# Patient Record
Sex: FEMALE | Race: OTHER
[De-identification: names, ages, dates, MRNs, and addresses within clinical notes are randomized per-mention and may not be internally consistent; named-entity substitution may affect disease eponyms.]

---

## 2023-05-23 ENCOUNTER — HOSPITAL ENCOUNTER (INPATIENT)
Dept: HOSPITAL 54 - ER | Age: 33
LOS: 4 days | Discharge: HOME | DRG: 263 | End: 2023-05-27
Attending: INTERNAL MEDICINE | Admitting: INTERNAL MEDICINE
Payer: COMMERCIAL

## 2023-05-23 VITALS — HEIGHT: 62 IN | BODY MASS INDEX: 27.97 KG/M2 | WEIGHT: 152 LBS

## 2023-05-23 DIAGNOSIS — R74.01: ICD-10-CM

## 2023-05-23 DIAGNOSIS — K66.0: ICD-10-CM

## 2023-05-23 DIAGNOSIS — F12.90: ICD-10-CM

## 2023-05-23 DIAGNOSIS — K80.10: Primary | ICD-10-CM

## 2023-05-23 DIAGNOSIS — R42: ICD-10-CM

## 2023-05-23 DIAGNOSIS — Z20.822: ICD-10-CM

## 2023-05-23 DIAGNOSIS — K21.9: ICD-10-CM

## 2023-05-23 PROCEDURE — A4223 INFUSION SUPPLIES W/O PUMP: HCPCS

## 2023-05-23 PROCEDURE — C9113 INJ PANTOPRAZOLE SODIUM, VIA: HCPCS

## 2023-05-23 PROCEDURE — C9803 HOPD COVID-19 SPEC COLLECT: HCPCS

## 2023-05-23 PROCEDURE — A6209 FOAM DRSG <=16 SQ IN W/O BDR: HCPCS

## 2023-05-23 PROCEDURE — A4216 STERILE WATER/SALINE, 10 ML: HCPCS

## 2023-05-23 PROCEDURE — G0378 HOSPITAL OBSERVATION PER HR: HCPCS

## 2023-05-23 NOTE — NUR
BREANNA FROM HOME FOR EPIGASTRIC PAIN AND N/V UNABLE TO TOLERATE ANY PO SINCE 
THIS AM. SEEING GI SPECIALIST  CURRENTLY. ALSO ADDS SHE HAS HAD MANY GI ISSUES 
SINCE BIRTH OF FIRST BABY IN JANUARY OF THIS YEAR. PLACED COMFORTABLY IN BED, 
VITALS CHECKED.

## 2023-05-24 VITALS — SYSTOLIC BLOOD PRESSURE: 97 MMHG | DIASTOLIC BLOOD PRESSURE: 67 MMHG

## 2023-05-24 VITALS — DIASTOLIC BLOOD PRESSURE: 59 MMHG | SYSTOLIC BLOOD PRESSURE: 99 MMHG

## 2023-05-24 VITALS — SYSTOLIC BLOOD PRESSURE: 105 MMHG | DIASTOLIC BLOOD PRESSURE: 72 MMHG

## 2023-05-24 LAB
ALBUMIN SERPL BCP-MCNC: 4.2 G/DL (ref 3.4–5)
ALP SERPL-CCNC: 97 U/L (ref 46–116)
ALT SERPL W P-5'-P-CCNC: 89 U/L (ref 12–78)
AST SERPL W P-5'-P-CCNC: 99 U/L (ref 15–37)
BASOPHILS # BLD AUTO: 0.1 K/UL (ref 0–0.2)
BASOPHILS NFR BLD AUTO: 0.5 % (ref 0–2)
BILIRUB DIRECT SERPL-MCNC: 0.9 MG/DL (ref 0–0.2)
BILIRUB SERPL-MCNC: 2.2 MG/DL (ref 0.2–1)
BUN SERPL-MCNC: 14 MG/DL (ref 7–18)
CALCIUM SERPL-MCNC: 9.8 MG/DL (ref 8.5–10.1)
CHLORIDE SERPL-SCNC: 104 MMOL/L (ref 98–107)
CO2 SERPL-SCNC: 26 MMOL/L (ref 21–32)
CREAT SERPL-MCNC: 1 MG/DL (ref 0.6–1.3)
EOSINOPHIL NFR BLD AUTO: 0.4 % (ref 0–6)
GLUCOSE SERPL-MCNC: 107 MG/DL (ref 74–106)
HCT VFR BLD AUTO: 41 % (ref 33–45)
HGB BLD-MCNC: 13.5 G/DL (ref 11.5–14.8)
LIPASE SERPL-CCNC: 50 U/L (ref 73–393)
LYMPHOCYTES NFR BLD AUTO: 1.9 K/UL (ref 0.8–4.8)
LYMPHOCYTES NFR BLD AUTO: 11.1 % (ref 20–44)
MCHC RBC AUTO-ENTMCNC: 33 G/DL (ref 31–36)
MCV RBC AUTO: 81 FL (ref 82–100)
MONOCYTES NFR BLD AUTO: 0.8 K/UL (ref 0.1–1.3)
MONOCYTES NFR BLD AUTO: 4.5 % (ref 2–12)
NEUTROPHILS # BLD AUTO: 14.5 K/UL (ref 1.8–8.9)
NEUTROPHILS NFR BLD AUTO: 83.5 % (ref 43–81)
PLATELET # BLD AUTO: 326 K/UL (ref 150–450)
POTASSIUM SERPL-SCNC: 3.4 MMOL/L (ref 3.5–5.1)
PROT SERPL-MCNC: 8 G/DL (ref 6.4–8.2)
RBC # BLD AUTO: 5.03 MIL/UL (ref 4–5.2)
SODIUM SERPL-SCNC: 140 MMOL/L (ref 136–145)
WBC NRBC COR # BLD AUTO: 17.4 K/UL (ref 4.3–11)

## 2023-05-24 RX ADMIN — DEXTROSE MONOHYDRATE PRN MG: 50 INJECTION, SOLUTION INTRAVENOUS at 22:09

## 2023-05-24 RX ADMIN — SODIUM CHLORIDE SCH MG: 9 INJECTION, SOLUTION INTRAVENOUS at 18:16

## 2023-05-24 RX ADMIN — PIPERACILLIN SODIUM AND TAZOBACTAM SODIUM SCH MLS/HR: .375; 3 INJECTION, POWDER, LYOPHILIZED, FOR SOLUTION INTRAVENOUS at 18:16

## 2023-05-24 RX ADMIN — SODIUM CHLORIDE SCH MG: 9 INJECTION, SOLUTION INTRAVENOUS at 14:22

## 2023-05-24 RX ADMIN — DEXTROSE MONOHYDRATE PRN MG: 50 INJECTION, SOLUTION INTRAVENOUS at 14:36

## 2023-05-24 NOTE — NUR
ANXIETY 

Patient wanted to sleep, request medication for ANXIETY. Education given to patient, ANXIETY 
medication and RESTORIL indication and possible side effect, patient verbalized 
understanding. Patient requesting medication she received last night- Ativan. Notified Dr. Gama with new order- Xanax 0.5mg q6h PRN.

## 2023-05-24 NOTE — NUR
SPOKE TO JENIFER BRIONES CM, FAXED OVER [employer] FOR TRANSFER NOTE AT HER REQUEST.

PT HAS RECEIVED A BED AT [facility], THEY WILL CALL BACK AFTER 
NOTE IS RECEIVED TO GIVE MORE ACCEPTANCE INFORMATION.

## 2023-05-24 NOTE — NUR
RN CLOSING NOTE:



PT AAOX3, BF AT BEDSIDE IRAJ. PT DENIES PAIN OR DISCOMFORT. NO SOB, CALL LIGHT WITHIN 
REACH. NEEDS ANSWERED PROMPTLY. ASSISTED TO BR AS NEEDED. BM X2 SMALL, VOIDED X2. CALM AND 
STABLE AT THIS TIME.

## 2023-05-25 VITALS — SYSTOLIC BLOOD PRESSURE: 101 MMHG | DIASTOLIC BLOOD PRESSURE: 64 MMHG

## 2023-05-25 VITALS — SYSTOLIC BLOOD PRESSURE: 94 MMHG | DIASTOLIC BLOOD PRESSURE: 56 MMHG

## 2023-05-25 VITALS — DIASTOLIC BLOOD PRESSURE: 63 MMHG | SYSTOLIC BLOOD PRESSURE: 91 MMHG

## 2023-05-25 LAB
ALBUMIN SERPL BCP-MCNC: 3.4 G/DL (ref 3.4–5)
ALP SERPL-CCNC: 105 U/L (ref 46–116)
ALT SERPL W P-5'-P-CCNC: 286 U/L (ref 12–78)
AST SERPL W P-5'-P-CCNC: 119 U/L (ref 15–37)
BASOPHILS # BLD AUTO: 0.1 K/UL (ref 0–0.2)
BASOPHILS NFR BLD AUTO: 1.1 % (ref 0–2)
BILIRUB SERPL-MCNC: 1.8 MG/DL (ref 0.2–1)
BUN SERPL-MCNC: 8 MG/DL (ref 7–18)
CALCIUM SERPL-MCNC: 9 MG/DL (ref 8.5–10.1)
CHLORIDE SERPL-SCNC: 107 MMOL/L (ref 98–107)
CO2 SERPL-SCNC: 24 MMOL/L (ref 21–32)
CREAT SERPL-MCNC: 0.9 MG/DL (ref 0.6–1.3)
EOSINOPHIL NFR BLD AUTO: 2.3 % (ref 0–6)
GLUCOSE SERPL-MCNC: 85 MG/DL (ref 74–106)
HCT VFR BLD AUTO: 35 % (ref 33–45)
HGB BLD-MCNC: 12.1 G/DL (ref 11.5–14.8)
LYMPHOCYTES NFR BLD AUTO: 2.8 K/UL (ref 0.8–4.8)
LYMPHOCYTES NFR BLD AUTO: 42.3 % (ref 20–44)
MAGNESIUM SERPL-MCNC: 2.2 MG/DL (ref 1.8–2.4)
MCHC RBC AUTO-ENTMCNC: 34 G/DL (ref 31–36)
MCV RBC AUTO: 81 FL (ref 82–100)
MONOCYTES NFR BLD AUTO: 0.4 K/UL (ref 0.1–1.3)
MONOCYTES NFR BLD AUTO: 5.9 % (ref 2–12)
NEUTROPHILS # BLD AUTO: 3.2 K/UL (ref 1.8–8.9)
NEUTROPHILS NFR BLD AUTO: 48.4 % (ref 43–81)
PLATELET # BLD AUTO: 232 K/UL (ref 150–450)
POTASSIUM SERPL-SCNC: 3.9 MMOL/L (ref 3.5–5.1)
PROT SERPL-MCNC: 6.7 G/DL (ref 6.4–8.2)
RBC # BLD AUTO: 4.36 MIL/UL (ref 4–5.2)
SODIUM SERPL-SCNC: 140 MMOL/L (ref 136–145)
TSH SERPL DL<=0.005 MIU/L-ACNC: 1.22 UIU/ML (ref 0.36–3.74)
WBC NRBC COR # BLD AUTO: 6.6 K/UL (ref 4.3–11)

## 2023-05-25 PROCEDURE — BF37ZZZ MAGNETIC RESONANCE IMAGING (MRI) OF PANCREAS: ICD-10-PCS

## 2023-05-25 PROCEDURE — BF36ZZZ MAGNETIC RESONANCE IMAGING (MRI) OF LIVER AND SPLEEN: ICD-10-PCS

## 2023-05-25 RX ADMIN — PIPERACILLIN SODIUM AND TAZOBACTAM SODIUM SCH MLS/HR: .375; 3 INJECTION, POWDER, LYOPHILIZED, FOR SOLUTION INTRAVENOUS at 00:43

## 2023-05-25 RX ADMIN — PIPERACILLIN SODIUM AND TAZOBACTAM SODIUM SCH MLS/HR: .375; 3 INJECTION, POWDER, LYOPHILIZED, FOR SOLUTION INTRAVENOUS at 11:32

## 2023-05-25 RX ADMIN — DEXTROSE MONOHYDRATE PRN MG: 50 INJECTION, SOLUTION INTRAVENOUS at 21:48

## 2023-05-25 RX ADMIN — PIPERACILLIN SODIUM AND TAZOBACTAM SODIUM SCH MLS/HR: .375; 3 INJECTION, POWDER, LYOPHILIZED, FOR SOLUTION INTRAVENOUS at 23:52

## 2023-05-25 RX ADMIN — PIPERACILLIN SODIUM AND TAZOBACTAM SODIUM SCH MLS/HR: .375; 3 INJECTION, POWDER, LYOPHILIZED, FOR SOLUTION INTRAVENOUS at 17:07

## 2023-05-25 RX ADMIN — PIPERACILLIN SODIUM AND TAZOBACTAM SODIUM SCH MLS/HR: .375; 3 INJECTION, POWDER, LYOPHILIZED, FOR SOLUTION INTRAVENOUS at 06:07

## 2023-05-25 RX ADMIN — TEMAZEPAM PRN MG: 7.5 CAPSULE ORAL at 00:51

## 2023-05-25 RX ADMIN — SODIUM CHLORIDE SCH MG: 9 INJECTION, SOLUTION INTRAVENOUS at 08:47

## 2023-05-25 RX ADMIN — TEMAZEPAM PRN MG: 7.5 CAPSULE ORAL at 21:48

## 2023-05-25 NOTE — NUR
MS RN OPENING NOTE



Received pt in bed, awake. A/O x 4, able to make needs known. No c/o pain/discomfort at this 
time. On room air, tolerating well. IV access in the LAC #20g, sl. Safety measures in place: 
bed in lowest locked position, side rails up x 2, call light and tray table within easy 
reach. Will continue to monitor.

## 2023-05-25 NOTE — NUR
RN NOTE



Consent for surgery signed by patient. Instructed patient not to eat and drink after 
midnight.

## 2023-05-25 NOTE — NUR
MS RN NOTE

PT STATED SHE WAS HAVING NAUSEA. PRN IV MEDICATION, ZOFRAN, ADMINISTERED TO THE PT PER MD 
ORDER.

## 2023-05-25 NOTE — NUR
MS RN NOTE

PT REQUESTED FOR SLEEPING PILL. PRN PO MEDICATION, TEMAZEPAM 7.5MG, ADMINISTERED TO THE PT 
PER MD ORDER.

## 2023-05-25 NOTE — NUR
MS RN NOTE

PT STATED THAT SHE WAS HAVING HEADACHE AND NEEDED MEDICATIONS FOR IT. PRN PO MEDICATION, 
TYLENOL 650 ML, ADMINISTERED PER MD ORDER.

## 2023-05-25 NOTE — NUR
END OF SHIFT REPORT

Patient in bed, Alert Oriented x4. Oxygen sat high 90's in RA. IV peripheral line left AC 
intact, on IV abx with no adverse side effect. Patient on Clear liquids diet. Afebrile 
during the night. Nausea improved with IV Zofran, no emesis. Denies abd pain. Plan for MRCP 
wo contrast. Patient consented procedure. MRI questionnaire checklist completed. Endorsed 
care to ALVINA Tanner.

## 2023-05-25 NOTE — NUR
MS RN CLOSING NOTE



Patient resting in bed, with relative at bedside. A/O x 4, able to make needs known. No c/o 
pain/discomfort at this time. On room air, tolerating well. IV access in the LAC #20g, sl. 
Safety measures in place: bed in lowest locked position, side rails up x 2, call light and 
tray table within easy reach. Will endorse dhaval to night shift.

## 2023-05-25 NOTE — NUR
MS RN OPENING NOTE

PATIENT IS RESTING IN BED, ALERT AND ORIENTED, AO X 4. SHE IS ON RA, TOLERATED WELL. NO S/S 
OF DISTRESS OR SOB. PT DENIES OF HAVING PAIN AT THIS MOMENT. IV ACCESS IS AT HER L AC, #20G, 
SL. FLUSHED WELL WITH NS. IV SITE IS PATENT AND INTACT. SAFETY MEASURES ARE IN PLACED: BED 
IN LOWEST AND LOCKED POSITION; SIDE RAILS UP X 2; CALL LIGHT AND TABLE ARE WITHIN REACH. 
WILL CONTINUE MONITORING THE PT AND PROVIDE THE CARE PT NEEDS.

## 2023-05-26 VITALS — DIASTOLIC BLOOD PRESSURE: 74 MMHG | SYSTOLIC BLOOD PRESSURE: 110 MMHG

## 2023-05-26 VITALS — SYSTOLIC BLOOD PRESSURE: 108 MMHG | DIASTOLIC BLOOD PRESSURE: 72 MMHG

## 2023-05-26 VITALS — SYSTOLIC BLOOD PRESSURE: 122 MMHG | DIASTOLIC BLOOD PRESSURE: 83 MMHG

## 2023-05-26 LAB
ALBUMIN SERPL BCP-MCNC: 3.6 G/DL (ref 3.4–5)
ALP SERPL-CCNC: 101 U/L (ref 46–116)
ALT SERPL W P-5'-P-CCNC: 203 U/L (ref 12–78)
AST SERPL W P-5'-P-CCNC: 41 U/L (ref 15–37)
BASOPHILS # BLD AUTO: 0.1 K/UL (ref 0–0.2)
BASOPHILS NFR BLD AUTO: 1 % (ref 0–2)
BILIRUB SERPL-MCNC: 1.5 MG/DL (ref 0.2–1)
BUN SERPL-MCNC: 7 MG/DL (ref 7–18)
CALCIUM SERPL-MCNC: 9.1 MG/DL (ref 8.5–10.1)
CHLORIDE SERPL-SCNC: 104 MMOL/L (ref 98–107)
CO2 SERPL-SCNC: 23 MMOL/L (ref 21–32)
CREAT SERPL-MCNC: 0.9 MG/DL (ref 0.6–1.3)
EOSINOPHIL NFR BLD AUTO: 2.3 % (ref 0–6)
GLUCOSE SERPL-MCNC: 87 MG/DL (ref 74–106)
HCT VFR BLD AUTO: 38 % (ref 33–45)
HGB BLD-MCNC: 12.9 G/DL (ref 11.5–14.8)
LYMPHOCYTES NFR BLD AUTO: 2.5 K/UL (ref 0.8–4.8)
LYMPHOCYTES NFR BLD AUTO: 34.9 % (ref 20–44)
MCHC RBC AUTO-ENTMCNC: 34 G/DL (ref 31–36)
MCV RBC AUTO: 82 FL (ref 82–100)
MONOCYTES NFR BLD AUTO: 0.4 K/UL (ref 0.1–1.3)
MONOCYTES NFR BLD AUTO: 5.7 % (ref 2–12)
NEUTROPHILS # BLD AUTO: 4 K/UL (ref 1.8–8.9)
NEUTROPHILS NFR BLD AUTO: 56.1 % (ref 43–81)
PLATELET # BLD AUTO: 255 K/UL (ref 150–450)
POTASSIUM SERPL-SCNC: 3.6 MMOL/L (ref 3.5–5.1)
PROT SERPL-MCNC: 6.9 G/DL (ref 6.4–8.2)
RBC # BLD AUTO: 4.67 MIL/UL (ref 4–5.2)
SODIUM SERPL-SCNC: 138 MMOL/L (ref 136–145)
WBC NRBC COR # BLD AUTO: 7.1 K/UL (ref 4.3–11)

## 2023-05-26 PROCEDURE — 0FT44ZZ RESECTION OF GALLBLADDER, PERCUTANEOUS ENDOSCOPIC APPROACH: ICD-10-PCS

## 2023-05-26 PROCEDURE — 0DNU4ZZ RELEASE OMENTUM, PERCUTANEOUS ENDOSCOPIC APPROACH: ICD-10-PCS

## 2023-05-26 RX ADMIN — DEXTROSE MONOHYDRATE PRN MG: 50 INJECTION, SOLUTION INTRAVENOUS at 11:10

## 2023-05-26 RX ADMIN — PIPERACILLIN SODIUM AND TAZOBACTAM SODIUM SCH MLS/HR: .375; 3 INJECTION, POWDER, LYOPHILIZED, FOR SOLUTION INTRAVENOUS at 18:26

## 2023-05-26 RX ADMIN — GABAPENTIN SCH MG: 100 CAPSULE ORAL at 11:10

## 2023-05-26 RX ADMIN — CELECOXIB SCH MG: 100 CAPSULE ORAL at 22:41

## 2023-05-26 RX ADMIN — ACETAMINOPHEN SCH MG: 325 TABLET ORAL at 11:10

## 2023-05-26 RX ADMIN — DEXTROSE MONOHYDRATE PRN MG: 50 INJECTION, SOLUTION INTRAVENOUS at 16:51

## 2023-05-26 RX ADMIN — GABAPENTIN SCH MG: 100 CAPSULE ORAL at 19:43

## 2023-05-26 RX ADMIN — PIPERACILLIN SODIUM AND TAZOBACTAM SODIUM SCH MLS/HR: .375; 3 INJECTION, POWDER, LYOPHILIZED, FOR SOLUTION INTRAVENOUS at 05:01

## 2023-05-26 RX ADMIN — PANTOPRAZOLE SODIUM SCH MG: 40 GRANULE, DELAYED RELEASE ORAL at 09:00

## 2023-05-26 RX ADMIN — ACETAMINOPHEN SCH MG: 325 TABLET ORAL at 19:43

## 2023-05-26 RX ADMIN — SODIUM CHLORIDE, SODIUM LACTATE, POTASSIUM CHLORIDE, AND CALCIUM CHLORIDE PRN MLS/HR: .6; .31; .03; .02 INJECTION, SOLUTION INTRAVENOUS at 19:10

## 2023-05-26 RX ADMIN — TEMAZEPAM PRN MG: 7.5 CAPSULE ORAL at 22:41

## 2023-05-26 RX ADMIN — PIPERACILLIN SODIUM AND TAZOBACTAM SODIUM SCH MLS/HR: .375; 3 INJECTION, POWDER, LYOPHILIZED, FOR SOLUTION INTRAVENOUS at 12:25

## 2023-05-26 RX ADMIN — CELECOXIB SCH MG: 100 CAPSULE ORAL at 11:09

## 2023-05-26 NOTE — NUR
RN  CLOSING NOTES  



 PATIENT   ON BED   AWAKE     S/P  LAPAROSCOPIC   CHOLECESTECTOMY ,  AWAKE AND  VERBALLY  
RESPONSIVE , V/S  TAKEN  AND  RECORDED  . BP  110/74  , P 76 , RR  20 T  98.2, O2 SAT  98%  
ROOM AIR  ,  NO SOB OR DISTRESS  NOTED  AND   KEPT COMFORTABLE TO BED  , SURGICAL SITE NO 
BLEEDING NOTED AND     , NOW  ABLE  TO  TOLERATE  CLEAR LIQUID   WITH NO  ASPIRATIONS   
NOTED   ,  ALL DUE  MEDS  GIVEN  AS  ORDERED  , C/O OF PAIN AND  DISCOMFORT  AND   PATIENT   
GIVEN PAIN  MEDS  AS  ORDERED  , NAUSEA    NOTED  AND   ZOFRAN  GIVEN AS ORDERED  AND  WITH 
HELP   . IV ACCESS  ON  LAC  WITH  20  G  RUNNING  WITH  LR  @ 100 CC /HR  ,   ENDORSED  TO 
NEXT SHIFT

## 2023-05-26 NOTE — NUR
RN  NOTES  



RECEIVED  PATIENT  FROM  SURGERY  S/P  LAPAROSCOPIC   CHOLECESTECTOMY ,  AWAKE AND  VERBALLY 
 RESPONSIVE , V/S  TAKEN  AND  RECORDED  . BP  110/74  , P 76 , RR  20 T  98.2, O2 SAT  98%  
ROOM AIR  ,  NO SOB OR DISTRESS  NOTED  AND   KEPT COMFORTABLE TO BED  , SURGICAL SITE NO 
BLEEDING NOTED AND   PATIENT WILL HAVE  ICE  CHIPS   AND TO  ADVANCE   AS  TOLERATED  ,  
WILL  CONTINUE  TO MONITOR

## 2023-05-26 NOTE — NUR
MS RN CLOSING NOTE

PATIENT IS RESTING IN BED, ALERT AND ORIENTED, AO X 4. SHE IS ON RA, TOLERATED WELL. NO S/S 
OF DISTRESS OR SOB. PT DENIES OF HAVING PAIN AT THIS MOMENT. IV ACCESS IS AT HER L AC, #20G, 
SL. FLUSHED WELL WITH NS. IV SITE IS PATENT AND INTACT. SAFETY MEASURES ARE IN PLACED: BED 
IN LOWEST AND LOCKED POSITION; SIDE RAILS UP X 2; CALL LIGHT AND TABLE ARE WITHIN REACH. 
WILL ENDORSE NEXT SHIFT NURSE FOR CONTINUING PT CARE.

## 2023-05-26 NOTE — NUR
MS RN OPENING NOTE

PATIENT IS RESTING IN BED, ALERT AND ORIENTED, AO X 4. SHE IS ON RA, TOLERATED WELL. NO S/S 
OF DISTRESS OR SOB. PT DENIES OF HAVING PAIN AT THIS MOMENT. IV ACCESS IS AT HER L AC, #20G, 
INFUSING LR @ 100 ML / HR. IV SITE IS PATENT AND INTACT. SAFETY MEASURES ARE IN PLACED: BED 
IN LOWEST AND LOCKED POSITION; SIDE RAILS UP X 2; CALL LIGHT AND TABLE ARE WITHIN REACH. 
WILL CONTINUE MONITORING THE PT AND PROVIDE THE CARE PT NEEDS.

## 2023-05-26 NOTE — NUR
MS RN NOTE

PT STATED THAT SHE NEEDED SLEEPING PILL FOR HER INSOMNIA. PRN MEDICATION, TEMAZEPAM, 
ADMINISTERED PER MD ORDER.

## 2023-05-27 VITALS — SYSTOLIC BLOOD PRESSURE: 102 MMHG | DIASTOLIC BLOOD PRESSURE: 65 MMHG

## 2023-05-27 LAB
ALBUMIN SERPL BCP-MCNC: 3.2 G/DL (ref 3.4–5)
ALP SERPL-CCNC: 84 U/L (ref 46–116)
ALT SERPL W P-5'-P-CCNC: 130 U/L (ref 12–78)
AST SERPL W P-5'-P-CCNC: 25 U/L (ref 15–37)
BASOPHILS # BLD AUTO: 0.1 K/UL (ref 0–0.2)
BASOPHILS NFR BLD AUTO: 0.6 % (ref 0–2)
BILIRUB SERPL-MCNC: 1 MG/DL (ref 0.2–1)
BUN SERPL-MCNC: 5 MG/DL (ref 7–18)
CALCIUM SERPL-MCNC: 9.1 MG/DL (ref 8.5–10.1)
CHLORIDE SERPL-SCNC: 107 MMOL/L (ref 98–107)
CO2 SERPL-SCNC: 25 MMOL/L (ref 21–32)
CREAT SERPL-MCNC: 0.8 MG/DL (ref 0.6–1.3)
EOSINOPHIL NFR BLD AUTO: 1 % (ref 0–6)
GLUCOSE SERPL-MCNC: 89 MG/DL (ref 74–106)
HCT VFR BLD AUTO: 36 % (ref 33–45)
HGB BLD-MCNC: 12.3 G/DL (ref 11.5–14.8)
LYMPHOCYTES NFR BLD AUTO: 2.5 K/UL (ref 0.8–4.8)
LYMPHOCYTES NFR BLD AUTO: 26.6 % (ref 20–44)
MAGNESIUM SERPL-MCNC: 2.2 MG/DL (ref 1.8–2.4)
MCHC RBC AUTO-ENTMCNC: 34 G/DL (ref 31–36)
MCV RBC AUTO: 82 FL (ref 82–100)
MONOCYTES NFR BLD AUTO: 0.6 K/UL (ref 0.1–1.3)
MONOCYTES NFR BLD AUTO: 6.1 % (ref 2–12)
NEUTROPHILS # BLD AUTO: 6.1 K/UL (ref 1.8–8.9)
NEUTROPHILS NFR BLD AUTO: 65.7 % (ref 43–81)
PHOSPHATE SERPL-MCNC: 3.9 MG/DL (ref 2.5–4.9)
PLATELET # BLD AUTO: 242 K/UL (ref 150–450)
POTASSIUM SERPL-SCNC: 3.7 MMOL/L (ref 3.5–5.1)
PROT SERPL-MCNC: 6.4 G/DL (ref 6.4–8.2)
RBC # BLD AUTO: 4.46 MIL/UL (ref 4–5.2)
SODIUM SERPL-SCNC: 141 MMOL/L (ref 136–145)
WBC NRBC COR # BLD AUTO: 9.3 K/UL (ref 4.3–11)

## 2023-05-27 RX ADMIN — PANTOPRAZOLE SODIUM SCH MG: 40 GRANULE, DELAYED RELEASE ORAL at 09:32

## 2023-05-27 RX ADMIN — MECLIZINE HYDROCLORIDE SCH MG: 25 TABLET ORAL at 13:49

## 2023-05-27 RX ADMIN — SODIUM CHLORIDE, SODIUM LACTATE, POTASSIUM CHLORIDE, AND CALCIUM CHLORIDE PRN MLS/HR: .6; .31; .03; .02 INJECTION, SOLUTION INTRAVENOUS at 05:27

## 2023-05-27 RX ADMIN — GABAPENTIN SCH MG: 100 CAPSULE ORAL at 03:40

## 2023-05-27 RX ADMIN — CELECOXIB SCH MG: 100 CAPSULE ORAL at 12:47

## 2023-05-27 RX ADMIN — PIPERACILLIN SODIUM AND TAZOBACTAM SODIUM SCH MLS/HR: .375; 3 INJECTION, POWDER, LYOPHILIZED, FOR SOLUTION INTRAVENOUS at 12:00

## 2023-05-27 RX ADMIN — ACETAMINOPHEN SCH MG: 325 TABLET ORAL at 03:40

## 2023-05-27 RX ADMIN — ACETAMINOPHEN SCH MG: 325 TABLET ORAL at 12:48

## 2023-05-27 RX ADMIN — GABAPENTIN SCH MG: 100 CAPSULE ORAL at 12:47

## 2023-05-27 RX ADMIN — MECLIZINE HYDROCLORIDE SCH MG: 25 TABLET ORAL at 09:32

## 2023-05-27 RX ADMIN — PIPERACILLIN SODIUM AND TAZOBACTAM SODIUM SCH MLS/HR: .375; 3 INJECTION, POWDER, LYOPHILIZED, FOR SOLUTION INTRAVENOUS at 05:19

## 2023-05-27 RX ADMIN — PIPERACILLIN SODIUM AND TAZOBACTAM SODIUM SCH MLS/HR: .375; 3 INJECTION, POWDER, LYOPHILIZED, FOR SOLUTION INTRAVENOUS at 00:01

## 2023-05-27 NOTE — NUR
MS RN OPENING NOTE

PATIENT IS RESTING IN BED, ALERT AND ORIENTED, AO X 4. SHE IS ON RA, TOLERATED WELL. NO S/S 
OF DISTRESS OR SOB. PT DENIES OF HAVING PAIN AT THIS MOMENT. IV ACCESS IS AT HER L AC, #20G, 
SL. FLUSHED WELL WITH NS. IV SITE IS PATENT AND INTACT. SAFETY MEASURES ARE IN PLACED: BED 
IN LOWEST AND LOCKED POSITION; SIDE RAILS UP X 2; CALL LIGHT AND TABLE ARE WITHIN REACH. 
WILL CONTINUE MONITOR AND CARE FOR THE PT PER MD 'S POC.

## 2023-05-27 NOTE — NUR
MS RN DISCHARGE TO HOME NOTE (DAY SHIFT)



Patient tolerated the removal of the # 20 gauge PIV catheter fully intact from her left arm 
antecubital space without any signs of complications of IV therapy.  Patient demonstrated 
understanding of her home care discharge instructions using the teach back method in her own 
words.  Patient departed the [address], walking with steady gait to private car with 
her boy friend, Hilton, at 14:40 hours, bound for her home.

## 2023-05-27 NOTE — NUR
MS RN CLOSING NOTE

PATIENT IS RESTING IN BED, ALERT AND ORIENTED, AO X 4. SHE IS ON RA, TOLERATED WELL. NO S/S 
OF DISTRESS OR SOB. PT DENIES OF HAVING PAIN AT THIS MOMENT. IV ACCESS IS AT HER L AC, #20G, 
INFUSING LR @100 ML / HR. IV SITE IS PATENT AND INTACT. SAFETY MEASURES ARE IN PLACED: BED 
IN LOWEST AND LOCKED POSITION; SIDE RAILS UP X 2; CALL LIGHT AND TABLE ARE WITHIN REACH. 
WILL ENDORSE NEXT SHIFT NURSE FOR CONTINUING PT CARE.